# Patient Record
(demographics unavailable — no encounter records)

---

## 2025-03-24 NOTE — HISTORY OF PRESENT ILLNESS
[de-identified] : Sore throat [FreeTextEntry6] : Patient is a 6-year-old female brought to office by alber for sore throat for 3 days.  Patient also complaining of abdominal pain for the past 3 days.  Patient has canker sores in her mouth according to dad.  Patient has had no vomiting no diarrhea.  Patient ate and drank well yesterday.  Dad noticed a slight rash on patient's face today patient had a 99.9 degree temperature 2 days ago.  alber states fifth disease is in the school

## 2025-03-24 NOTE — DISCUSSION/SUMMARY
[FreeTextEntry1] : Discussed viral illnesses including fifth degrees at length with father.  Discussed aphthous ulcers.  Patient to take Motrin for pain.  Increase fluids, monitor temperature. Call immediately if any worsening signs or symptoms. Parent understands the plan.

## 2025-03-24 NOTE — PHYSICAL EXAM
[Erythematous Oropharynx] : erythematous oropharynx [NL] : moves all extremities x4, warm, well perfused x4 [de-identified] : 1 aphthous ulcer on posterior pharynx and 1 on inside of cheek on left side [de-identified] : Bilateral cheeks erythematous, lacy red rash over bilateral forearms

## 2025-04-16 NOTE — DISCUSSION/SUMMARY
[Normal Growth] : growth [Normal Development] : development [None] : No known medical problems [No Elimination Concerns] : elimination [No Feeding Concerns] : feeding [No Skin Concerns] : skin [Normal Sleep Pattern] : sleep [School Readiness] : school readiness [Mental Health] : mental health [Nutrition and Physical Activity] : nutrition and physical activity [Oral Health] : oral health [Safety] : safety [No Medications] : ~He/She~ is not on any medications [Patient] : patient [FreeTextEntry1] : Discussed patient's growth and development. Discussed afterschool activities. Reviewed immunizations. Forms filled out for school. Return to office in one year or p.r.n.. Parent understand the plan

## 2025-04-16 NOTE — HISTORY OF PRESENT ILLNESS
[Mother] : mother [___ stools per day] : [unfilled]  stools per day [Normal] : Normal [Brushing teeth] : Brushing teeth [Yes] : Patient goes to dentist yearly [Appropiate parent-child-sibling interaction] : Appropriate parent-child-sibling interaction [Child Cooperates] : Child cooperates [Parent has appropriate responses to behavior] : Parent has appropriate responses to behavior [Grade ___] : Grade [unfilled] [No difficulties with Homework] : No difficulties with homework [Adequate performance] : Adequate performance [Adequate attention] : Adequate attention [No] : Not at  exposure [Car seat in back seat] : Car seat in back seat [FreeTextEntry7] : Patient has been well [de-identified] : Regular diet